# Patient Record
Sex: FEMALE | Employment: UNEMPLOYED | URBAN - METROPOLITAN AREA
[De-identification: names, ages, dates, MRNs, and addresses within clinical notes are randomized per-mention and may not be internally consistent; named-entity substitution may affect disease eponyms.]

---

## 2019-09-12 ENCOUNTER — HOSPITAL ENCOUNTER (EMERGENCY)
Facility: CLINIC | Age: 49
Discharge: HOME | End: 2019-09-12
Attending: EMERGENCY MEDICINE | Admitting: EMERGENCY MEDICINE

## 2019-09-12 ENCOUNTER — APPOINTMENT (OUTPATIENT)
Dept: GENERAL RADIOLOGY | Facility: CLINIC | Age: 49
End: 2019-09-12
Attending: EMERGENCY MEDICINE

## 2019-09-12 VITALS
SYSTOLIC BLOOD PRESSURE: 123 MMHG | OXYGEN SATURATION: 99 % | TEMPERATURE: 98.2 F | HEART RATE: 59 BPM | RESPIRATION RATE: 18 BRPM | DIASTOLIC BLOOD PRESSURE: 64 MMHG
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
## 2019-09-12 DIAGNOSIS — M54.50 ACUTE MIDLINE LOW BACK PAIN WITHOUT SCIATICA: ICD-10-CM

## 2019-09-12 PROCEDURE — 99283 EMERGENCY DEPT VISIT LOW MDM: CPT

## 2019-09-12 PROCEDURE — 72100 X-RAY EXAM L-S SPINE 2/3 VWS: CPT

## 2019-09-12 PROCEDURE — 25000132 ZZH RX MED GY IP 250 OP 250 PS 637: Performed by: EMERGENCY MEDICINE

## 2019-09-12 RX ORDER — CYCLOBENZAPRINE HCL 10 MG
5-10 TABLET ORAL 3 TIMES DAILY PRN
Qty: 20 TABLET | Refills: 0 | Status: SHIPPED | OUTPATIENT
Start: 2019-09-12

## 2019-09-12 RX ORDER — CYCLOBENZAPRINE HCL 10 MG
10 TABLET ORAL ONCE
Status: COMPLETED | OUTPATIENT
Start: 2019-09-12 | End: 2019-09-12

## 2019-09-12 RX ORDER — IBUPROFEN 600 MG/1
600 TABLET, FILM COATED ORAL ONCE
Status: COMPLETED | OUTPATIENT
Start: 2019-09-12 | End: 2019-09-12

## 2019-09-12 RX ORDER — NAPROXEN 500 MG/1
500 TABLET ORAL 2 TIMES DAILY PRN
Qty: 24 TABLET | Refills: 0 | Status: SHIPPED | OUTPATIENT
Start: 2019-09-12 | End: 2019-09-20

## 2019-09-12 RX ADMIN — CYCLOBENZAPRINE HYDROCHLORIDE 10 MG: 10 TABLET, FILM COATED ORAL at 22:43

## 2019-09-12 RX ADMIN — IBUPROFEN 600 MG: 600 TABLET, FILM COATED ORAL at 21:25

## 2019-09-12 ASSESSMENT — ENCOUNTER SYMPTOMS
NUMBNESS: 0
WEAKNESS: 0
BACK PAIN: 1
NECK PAIN: 0

## 2019-09-12 NOTE — ED AVS SNAPSHOT
[facility] Emergency Department  [address]  [facility] [address]  Phone:  [phone]  Fax:  [phone]                                    Rossy Lennon   MRN: [id number]    Department:  [facility] Emergency Department   Date of Visit:  9/12/2019           After Visit Summary Signature Page    I have received my discharge instructions, and my questions have been answered. I have discussed any challenges I see with this plan with the nurse or doctor.    ..........................................................................................................................................  Patient/Patient Representative Signature      ..........................................................................................................................................  Patient Representative Print Name and Relationship to Patient    ..................................................               ................................................  Date                                   Time    ..........................................................................................................................................  Reviewed by Signature/Title    ...................................................              ..............................................  Date                                               Time          22EPIC Rev 08/18
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
## 2019-12-12 ENCOUNTER — APPOINTMENT (OUTPATIENT)
Dept: ULTRASOUND IMAGING | Facility: CLINIC | Age: 49
End: 2019-12-12
Attending: EMERGENCY MEDICINE

## 2019-12-12 ENCOUNTER — HOSPITAL ENCOUNTER (EMERGENCY)
Facility: CLINIC | Age: 49
Discharge: HOME | End: 2019-12-13
Attending: EMERGENCY MEDICINE | Admitting: EMERGENCY MEDICINE

## 2019-12-12 DIAGNOSIS — D64.9 ANEMIA, UNSPECIFIED TYPE: ICD-10-CM

## 2019-12-12 DIAGNOSIS — N93.9 EXCESSIVE VAGINAL BLEEDING: ICD-10-CM

## 2019-12-12 LAB
ANION GAP SERPL CALCULATED.3IONS-SCNC: 4 MMOL/L (ref 3–14)
B-HCG FREE SERPL-ACNC: <5 IU/L
BASOPHILS # BLD AUTO: 0 10E9/L (ref 0–0.2)
BASOPHILS NFR BLD AUTO: 0.4 %
BUN SERPL-MCNC: 19 MG/DL (ref 7–30)
CALCIUM SERPL-MCNC: 8.5 MG/DL (ref 8.5–10.1)
CHLORIDE SERPL-SCNC: 108 MMOL/L (ref 94–109)
CO2 SERPL-SCNC: 27 MMOL/L (ref 20–32)
CREAT SERPL-MCNC: 0.65 MG/DL (ref 0.52–1.04)
DIFFERENTIAL METHOD BLD: ABNORMAL
EOSINOPHIL # BLD AUTO: 0.2 10E9/L (ref 0–0.7)
EOSINOPHIL NFR BLD AUTO: 4.2 %
ERYTHROCYTE [DISTWIDTH] IN BLOOD BY AUTOMATED COUNT: 12.8 % (ref 10–15)
GFR SERPL CREATININE-BSD FRML MDRD: >90 ML/MIN/{1.73_M2}
GLUCOSE SERPL-MCNC: 92 MG/DL (ref 70–99)
HCT VFR BLD AUTO: 31.3 % (ref 35–47)
HGB BLD-MCNC: 10.5 G/DL (ref 11.7–15.7)
IMM GRANULOCYTES # BLD: 0 10E9/L (ref 0–0.4)
IMM GRANULOCYTES NFR BLD: 0.2 %
LYMPHOCYTES # BLD AUTO: 2.3 10E9/L (ref 0.8–5.3)
LYMPHOCYTES NFR BLD AUTO: 45.2 %
MCH RBC QN AUTO: 32.1 PG (ref 26.5–33)
MCHC RBC AUTO-ENTMCNC: 33.5 G/DL (ref 31.5–36.5)
MCV RBC AUTO: 96 FL (ref 78–100)
MONOCYTES # BLD AUTO: 0.3 10E9/L (ref 0–1.3)
MONOCYTES NFR BLD AUTO: 6.3 %
NEUTROPHILS # BLD AUTO: 2.2 10E9/L (ref 1.6–8.3)
NEUTROPHILS NFR BLD AUTO: 43.7 %
NRBC # BLD AUTO: 0 10*3/UL
NRBC BLD AUTO-RTO: 0 /100
PLATELET # BLD AUTO: 222 10E9/L (ref 150–450)
POTASSIUM SERPL-SCNC: 3.5 MMOL/L (ref 3.4–5.3)
RBC # BLD AUTO: 3.27 10E12/L (ref 3.8–5.2)
SODIUM SERPL-SCNC: 139 MMOL/L (ref 133–144)
SPECIMEN SOURCE: NORMAL
WBC # BLD AUTO: 5 10E9/L (ref 4–11)
WET PREP SPEC: NORMAL

## 2019-12-12 PROCEDURE — 76830 TRANSVAGINAL US NON-OB: CPT

## 2019-12-12 PROCEDURE — 80048 BASIC METABOLIC PNL TOTAL CA: CPT | Performed by: EMERGENCY MEDICINE

## 2019-12-12 PROCEDURE — 96361 HYDRATE IV INFUSION ADD-ON: CPT

## 2019-12-12 PROCEDURE — 85025 COMPLETE CBC W/AUTO DIFF WBC: CPT | Performed by: EMERGENCY MEDICINE

## 2019-12-12 PROCEDURE — 87591 N.GONORRHOEAE DNA AMP PROB: CPT | Performed by: EMERGENCY MEDICINE

## 2019-12-12 PROCEDURE — 96360 HYDRATION IV INFUSION INIT: CPT

## 2019-12-12 PROCEDURE — 87491 CHLMYD TRACH DNA AMP PROBE: CPT | Performed by: EMERGENCY MEDICINE

## 2019-12-12 PROCEDURE — 25800030 ZZH RX IP 258 OP 636: Performed by: EMERGENCY MEDICINE

## 2019-12-12 PROCEDURE — 84702 CHORIONIC GONADOTROPIN TEST: CPT

## 2019-12-12 PROCEDURE — 99284 EMERGENCY DEPT VISIT MOD MDM: CPT | Mod: 25

## 2019-12-12 PROCEDURE — 87210 SMEAR WET MOUNT SALINE/INK: CPT | Performed by: EMERGENCY MEDICINE

## 2019-12-12 RX ORDER — MEGESTROL ACETATE 40 MG/1
40 TABLET ORAL DAILY
Qty: 30 TABLET | Refills: 0 | Status: SHIPPED | OUTPATIENT
Start: 2019-12-12

## 2019-12-12 RX ORDER — SODIUM CHLORIDE 9 MG/ML
1000 INJECTION, SOLUTION INTRAVENOUS CONTINUOUS
Status: DISCONTINUED | OUTPATIENT
Start: 2019-12-12 | End: 2019-12-13 | Stop reason: HOSPADM

## 2019-12-12 RX ADMIN — SODIUM CHLORIDE 1000 ML: 9 INJECTION, SOLUTION INTRAVENOUS at 21:12

## 2019-12-12 ASSESSMENT — ENCOUNTER SYMPTOMS
DIZZINESS: 1
LIGHT-HEADEDNESS: 1

## 2019-12-12 NOTE — ED AVS SNAPSHOT
[facility] Emergency Department  [address]  [facility] [address]  Phone:  [phone]  Fax:  [phone]                                    Rossy Lennon   MRN: [id number]    Department:  [facility] Emergency Department   Date of Visit:  12/12/2019           After Visit Summary Signature Page    I have received my discharge instructions, and my questions have been answered. I have discussed any challenges I see with this plan with the nurse or doctor.    ..........................................................................................................................................  Patient/Patient Representative Signature      ..........................................................................................................................................  Patient Representative Print Name and Relationship to Patient    ..................................................               ................................................  Date                                   Time    ..........................................................................................................................................  Reviewed by Signature/Title    ...................................................              ..............................................  Date                                               Time          22EPIC Rev 08/18

## 2019-12-13 ENCOUNTER — TELEPHONE (OUTPATIENT)
Dept: EMERGENCY MEDICINE | Facility: CLINIC | Age: 49
End: 2019-12-13

## 2019-12-13 VITALS
TEMPERATURE: 97.6 F | WEIGHT: 134.92 LBS | RESPIRATION RATE: 18 BRPM | SYSTOLIC BLOOD PRESSURE: 121 MMHG | DIASTOLIC BLOOD PRESSURE: 80 MMHG | OXYGEN SATURATION: 95 % | HEART RATE: 50 BPM

## 2019-12-13 DIAGNOSIS — A74.9 CHLAMYDIA: ICD-10-CM

## 2019-12-13 DIAGNOSIS — Z91.89 AT RISK FOR NAUSEA: ICD-10-CM

## 2019-12-13 LAB
C TRACH DNA SPEC QL NAA+PROBE: POSITIVE
N GONORRHOEA DNA SPEC QL NAA+PROBE: NEGATIVE
SPECIMEN SOURCE: ABNORMAL
SPECIMEN SOURCE: NORMAL

## 2019-12-13 RX ORDER — AZITHROMYCIN 500 MG/1
1000 TABLET, FILM COATED ORAL ONCE
Qty: 2 TABLET | Refills: 0 | Status: SHIPPED | OUTPATIENT
Start: 2019-12-13 | End: 2019-12-13

## 2019-12-13 RX ORDER — ONDANSETRON 4 MG/1
4 TABLET, ORALLY DISINTEGRATING ORAL ONCE
Qty: 1 TABLET | Refills: 0 | Status: SHIPPED | OUTPATIENT
Start: 2019-12-13 | End: 2019-12-13

## 2019-12-13 NOTE — ED PROVIDER NOTES
History     Chief Complaint:  Vaginal Bleeding    Libyan interpretation is provided by friend at bedside. Phone/Ipad  services declined.    HPI   Rossy Lennon is a 49 year old female who presents with vaginal bleeding. The patient reports onset of bleeding on 11/28, constant since then. She states that her bleeding has been heavier, with clots, than her typical periods, which she normally wears pads for, but she has also been using tampons. She denies any pain here, but has lightheadedness and dizziness upon standing. No syncope. No reported history of heavy or irregular bleeding. The patient does not have an established OB, as she is new to the area. Denies any chance for pregnancy.     Allergies:  No known drug allergies    Medications:    The patient is not currently taking any prescribed medications.    Past Medical History:    The patient is not currently taking any prescribed medications.    Past Surgical History:    Tubal ligation    Family History:    History reviewed. No pertinent family history.     Social History:  Smoking status: Current every day smoker  Presents to the ED with a friend  Marital Status:  Single      Review of Systems   Genitourinary: Positive for vaginal bleeding.   Neurological: Positive for dizziness and light-headedness.   All other systems reviewed and are negative.      Physical Exam     Patient Vitals for the past 24 hrs:   BP Temp Temp src Pulse Resp SpO2 Weight   12/12/19 2021 123/67 97.6  F (36.4  C) Oral 67 18 100 % 61.2 kg (134 lb 14.7 oz)       Physical Exam  GEN: Adult female, sitting upright  EYES: PERRL, conjunctiva normal  ENT: Moist mucous membranes. Oropharynx clear. No exudate or lesions.  CV: Normal S1S2. Regular rate and rhythm. No murmurs, rubs, or gallops.  RESP: Clear to auscultation bilaterally. Normal effort. No wheezes, rales, or rhonchi.  GI: Abdomen is soft, nontender, nondistended.  GYN: Normal appearing external genitalia.  Moderate amount of blood and clot in the vaginal vault. Closed cervical os. Normal appearing cervix. No CMT. No adnexal tenderness or fullness.  MSK: Ambulatory. Moves all extremities normally. No edema.  Skin: Warm and dry. No rashes, lesions or ecchymoses. No petechiae.  Neuro: Alert and oriented.  Responds appropriately to all questions and commands. No focal abnormalities appreciated.  Psych: Normal affect. Pleasant.     Emergency Department Course   Imaging:  Radiographic findings were communicated with the patient who voiced understanding of the findings.    US Pelvic Complete with Transvaginal  1.  Heterogeneous thickened endometrium towards the fundus. Gynecologic consult recommended to consider tissue sampling.  2.  3.0 x 2.1 x 3.2 cm simple appearing right adnexal follicle or cyst. See guidelines below.  As read by Radiology.    Laboratory:  Wet prep: No trichomonas seen, no yeast seen, few PMNs seen, no clue cells seen  Chlamydia trachomatis PCR: In process  Neisseria gonorrhoea PCR: In process    CBC: HGB 10.5 (L) o/w WNL (WBC 5.0, )  BMP: WNL (Creatinine 0.65)    Interventions:  2112: NS 1L IV Bolus    Emergency Department Course:  Past medical records, nursing notes, and vitals reviewed.  2034: I performed an exam of the patient and obtained history, as documented above.     IV inserted and blood drawn.    I performed a pelvic exam on the patient, female RN supervised.    The patient was sent for a pelvic ultrasound while in the emergency department, findings above.    2333: I spoke to Dr. Whittington on-call for OB/Gyn.    2337: I rechecked the patient. Explained findings to patient. She denies any new concerns.     Findings and plan explained to the patient. She feels comfortable with this plan. Patient discharged home with instructions regarding supportive care, medications, and reasons to return. The importance of close follow-up was reviewed. The patient was prescribed Megestrol.    Impression &  Plan    Medical Decision Making:  Rossy Lennon is a 49 year old female primarily Arabic speaking who presents to the emergency department with a prolonged heavier period than her typical. She has otherwise had regular periods and this is new for her. Examination today is consistent with a thickened endometrium and mild anemia. She is not tachycardic or hypotensive. She is not pregnant. She does not have any abdominal pain or tenderness. As she is not unstable or having worse exsanguination. She was appropriate for discharge home with consultation with Dr. Whittington of OB/Gyn. Dr. Whittington recommended Megace 40 mg daily up to twice daily. If initial daily dosing does not control bleeding in a few days, recommend iron supplementation, which she can get over the counter. Recommended follow-up within 1 week with [facility] for biopsy. The patient is aware of the necessity of a biopsy and follow-up. Recommend that she return should her symptoms worsen. All questions were answered prior to discharge.     Diagnosis:    ICD-10-CM    1. Excessive vaginal bleeding N92.0 ISTAT HCG Quantitative Pregnancy POCT     ISTAT HCG Quantitative Pregnancy POCT   2. Anemia, unspecified type D64.9        Disposition: Discharged to home.    Discharge Medications:  New Prescriptions    MEGESTROL (MEGACE) 40 MG TABLET    Take 1 tablet (40 mg) by mouth daily May increase to 40mg by mouth twice daily with continued heavy bleeding after 2-3 days use     IMariana, am serving as a scribe at 8:34 PM on 12/12/2019 to document services personally performed by Chelsea Reddy MD based on my observations and the provider's statements to me.     Mariana Richter  12/12/2019   [facility] EMERGENCY DEPARTMENT       Chelsea Reddy MD  12/15/19 0385

## 2019-12-13 NOTE — ED TRIAGE NOTES
Started normal period about 2 weeks ago, but hasn't stopped. Has increased in heaviness and has had clots. Feeling weak, light headed and dizzy.

## 2019-12-27 ENCOUNTER — HOSPITAL ENCOUNTER (OUTPATIENT)
Facility: CLINIC | Age: 49
End: 2019-12-27
Attending: OBSTETRICS & GYNECOLOGY | Admitting: OBSTETRICS & GYNECOLOGY

## 2020-02-12 ENCOUNTER — APPOINTMENT (OUTPATIENT)
Dept: INTERPRETER SERVICES | Facility: CLINIC | Age: 50
End: 2020-02-12